# Patient Record
Sex: FEMALE | Race: WHITE
[De-identification: names, ages, dates, MRNs, and addresses within clinical notes are randomized per-mention and may not be internally consistent; named-entity substitution may affect disease eponyms.]

---

## 2018-01-20 ENCOUNTER — HOSPITAL ENCOUNTER (EMERGENCY)
Dept: HOSPITAL 96 - M.ERS | Age: 53
Discharge: HOME | End: 2018-01-20
Payer: COMMERCIAL

## 2018-01-20 VITALS — SYSTOLIC BLOOD PRESSURE: 115 MMHG | DIASTOLIC BLOOD PRESSURE: 69 MMHG

## 2018-01-20 VITALS — BODY MASS INDEX: 42.38 KG/M2 | HEIGHT: 67 IN | WEIGHT: 270 LBS

## 2018-01-20 DIAGNOSIS — Z88.8: ICD-10-CM

## 2018-01-20 DIAGNOSIS — J40: Primary | ICD-10-CM

## 2018-01-20 DIAGNOSIS — I10: ICD-10-CM

## 2018-01-20 NOTE — EKG
[place] [address]
Phone:  [phone]                     ELECTROCARDIOGRAM REPORT      
_______________________________________________________________________________
 
Name:       LYSSAONJAIDA               Room:                      [facility]#:  [id number]      Account #:      [id number]  
Admission:  18     Attend Phys:                         
Discharge:  18     Date of Birth:  [birth year]  
         Report #: [id number]
    [id number]
_______________________________________________________________________________
THIS REPORT FOR:  //name//                      
 
                         [facility] ED
                                       
Test Date:    2018               Test Time:    00:18:25
Pat Name:     JAIDA CANTRELL              Department:   
Patient ID:   [id number]            Room:          
Gender:       F                        Technician:   willem
:          1965               Requested By: Ashwin Leslie
Order Number: [id number]    Reading MD:   Mitul Hdz
                                 Measurements
Intervals                              Axis          
Rate:         61                       P:            44
MD:           171                      QRS:          11
QRSD:         100                      T:            59
QT:           421                                    
QTc:          424                                    
                           Interpretive Statements
Sinus rhythm
RSR' in V1 or V2, probably normal variant
Minimal ST depression, anterior leads
No previous ECG available for comparison
 
Electronically Signed On 2018 11:48:53 CST by Mitul Hdz
[email or web address]
 
 
 
 
 
 
 
 
 
 
 
 
 
 
 
 
 
 
  <ELECTRONICALLY SIGNED>
                                           By: Mitul Hdz MD, [facility]   
  18     1148
D: 18 0018   _____________________________________
T: 18 0018   Mitul Hdz MD, FACC     /EPI

## 2021-12-26 ENCOUNTER — HOSPITAL ENCOUNTER (EMERGENCY)
Dept: HOSPITAL 96 - M.ERS | Age: 56
Discharge: HOME | End: 2021-12-26
Payer: COMMERCIAL

## 2021-12-26 VITALS — SYSTOLIC BLOOD PRESSURE: 120 MMHG | DIASTOLIC BLOOD PRESSURE: 65 MMHG

## 2021-12-26 VITALS — WEIGHT: 240 LBS | BODY MASS INDEX: 37.67 KG/M2 | HEIGHT: 67 IN

## 2021-12-26 DIAGNOSIS — I10: ICD-10-CM

## 2021-12-26 DIAGNOSIS — Z79.899: ICD-10-CM

## 2021-12-26 DIAGNOSIS — U07.1: Primary | ICD-10-CM

## 2021-12-26 DIAGNOSIS — Z88.8: ICD-10-CM
